# Patient Record
Sex: MALE | Race: WHITE | URBAN - METROPOLITAN AREA
[De-identification: names, ages, dates, MRNs, and addresses within clinical notes are randomized per-mention and may not be internally consistent; named-entity substitution may affect disease eponyms.]

---

## 2019-06-11 ENCOUNTER — EMERGENCY (EMERGENCY)
Facility: HOSPITAL | Age: 59
LOS: 1 days | Discharge: HOME | End: 2019-06-11
Attending: EMERGENCY MEDICINE | Admitting: EMERGENCY MEDICINE
Payer: COMMERCIAL

## 2019-06-11 VITALS
SYSTOLIC BLOOD PRESSURE: 100 MMHG | HEART RATE: 73 BPM | OXYGEN SATURATION: 96 % | TEMPERATURE: 98 F | RESPIRATION RATE: 16 BRPM | WEIGHT: 164.91 LBS | DIASTOLIC BLOOD PRESSURE: 64 MMHG

## 2019-06-11 DIAGNOSIS — R10.31 RIGHT LOWER QUADRANT PAIN: ICD-10-CM

## 2019-06-11 DIAGNOSIS — I10 ESSENTIAL (PRIMARY) HYPERTENSION: ICD-10-CM

## 2019-06-11 DIAGNOSIS — Z88.0 ALLERGY STATUS TO PENICILLIN: ICD-10-CM

## 2019-06-11 DIAGNOSIS — M54.9 DORSALGIA, UNSPECIFIED: ICD-10-CM

## 2019-06-11 DIAGNOSIS — M54.5 LOW BACK PAIN: ICD-10-CM

## 2019-06-11 DIAGNOSIS — Z79.899 OTHER LONG TERM (CURRENT) DRUG THERAPY: ICD-10-CM

## 2019-06-11 LAB
ALBUMIN SERPL ELPH-MCNC: 4 G/DL — SIGNIFICANT CHANGE UP (ref 3.4–5)
ALP SERPL-CCNC: 56 U/L — SIGNIFICANT CHANGE UP (ref 40–120)
ALT FLD-CCNC: 21 U/L — SIGNIFICANT CHANGE UP (ref 12–42)
ANION GAP SERPL CALC-SCNC: 8 MMOL/L — LOW (ref 9–16)
APPEARANCE UR: CLEAR — SIGNIFICANT CHANGE UP
AST SERPL-CCNC: 18 U/L — SIGNIFICANT CHANGE UP (ref 15–37)
BASOPHILS NFR BLD AUTO: 0.3 % — SIGNIFICANT CHANGE UP (ref 0–2)
BILIRUB SERPL-MCNC: 1.1 MG/DL — SIGNIFICANT CHANGE UP (ref 0.2–1.2)
BILIRUB UR-MCNC: NEGATIVE — SIGNIFICANT CHANGE UP
BUN SERPL-MCNC: 24 MG/DL — HIGH (ref 7–23)
CALCIUM SERPL-MCNC: 9.7 MG/DL — SIGNIFICANT CHANGE UP (ref 8.5–10.5)
CHLORIDE SERPL-SCNC: 102 MMOL/L — SIGNIFICANT CHANGE UP (ref 96–108)
CO2 SERPL-SCNC: 28 MMOL/L — SIGNIFICANT CHANGE UP (ref 22–31)
COLOR SPEC: YELLOW — SIGNIFICANT CHANGE UP
CREAT SERPL-MCNC: 1.01 MG/DL — SIGNIFICANT CHANGE UP (ref 0.5–1.3)
DIFF PNL FLD: NEGATIVE — SIGNIFICANT CHANGE UP
EOSINOPHIL NFR BLD AUTO: 1.7 % — SIGNIFICANT CHANGE UP (ref 0–6)
GLUCOSE SERPL-MCNC: 122 MG/DL — HIGH (ref 70–99)
GLUCOSE UR QL: NEGATIVE — SIGNIFICANT CHANGE UP
HCT VFR BLD CALC: 44.7 % — SIGNIFICANT CHANGE UP (ref 39–50)
HGB BLD-MCNC: 15.7 G/DL — SIGNIFICANT CHANGE UP (ref 13–17)
IMM GRANULOCYTES NFR BLD AUTO: 0.4 % — SIGNIFICANT CHANGE UP (ref 0–1.5)
KETONES UR-MCNC: 15 MG/DL
LEUKOCYTE ESTERASE UR-ACNC: NEGATIVE — SIGNIFICANT CHANGE UP
LIDOCAIN IGE QN: 87 U/L — SIGNIFICANT CHANGE UP (ref 73–393)
LYMPHOCYTES # BLD AUTO: 10.8 % — LOW (ref 13–44)
MCHC RBC-ENTMCNC: 32.2 PG — SIGNIFICANT CHANGE UP (ref 27–34)
MCHC RBC-ENTMCNC: 35.1 G/DL — SIGNIFICANT CHANGE UP (ref 32–36)
MCV RBC AUTO: 91.8 FL — SIGNIFICANT CHANGE UP (ref 80–100)
MONOCYTES NFR BLD AUTO: 7.5 % — SIGNIFICANT CHANGE UP (ref 2–14)
NEUTROPHILS NFR BLD AUTO: 79.3 % — HIGH (ref 43–77)
NITRITE UR-MCNC: NEGATIVE — SIGNIFICANT CHANGE UP
PH UR: 6 — SIGNIFICANT CHANGE UP (ref 5–8)
PLATELET # BLD AUTO: 259 K/UL — SIGNIFICANT CHANGE UP (ref 150–400)
POTASSIUM SERPL-MCNC: 4.3 MMOL/L — SIGNIFICANT CHANGE UP (ref 3.5–5.3)
POTASSIUM SERPL-SCNC: 4.3 MMOL/L — SIGNIFICANT CHANGE UP (ref 3.5–5.3)
PROT SERPL-MCNC: 7.5 G/DL — SIGNIFICANT CHANGE UP (ref 6.4–8.2)
PROT UR-MCNC: NEGATIVE MG/DL — SIGNIFICANT CHANGE UP
RBC # BLD: 4.87 M/UL — SIGNIFICANT CHANGE UP (ref 4.2–5.8)
RBC # FLD: 13.1 % — SIGNIFICANT CHANGE UP (ref 10.3–14.5)
SODIUM SERPL-SCNC: 138 MMOL/L — SIGNIFICANT CHANGE UP (ref 132–145)
SP GR SPEC: 1.02 — SIGNIFICANT CHANGE UP (ref 1–1.03)
UROBILINOGEN FLD QL: 0.2 E.U./DL — SIGNIFICANT CHANGE UP
WBC # BLD: 11.9 K/UL — HIGH (ref 3.8–10.5)
WBC # FLD AUTO: 11.9 K/UL — HIGH (ref 3.8–10.5)

## 2019-06-11 PROCEDURE — 99218: CPT

## 2019-06-11 PROCEDURE — 71275 CT ANGIOGRAPHY CHEST: CPT | Mod: 26

## 2019-06-11 PROCEDURE — 74176 CT ABD & PELVIS W/O CONTRAST: CPT | Mod: 26

## 2019-06-11 PROCEDURE — 74174 CTA ABD&PLVS W/CONTRAST: CPT | Mod: 26

## 2019-06-11 RX ORDER — MORPHINE SULFATE 50 MG/1
2 CAPSULE, EXTENDED RELEASE ORAL ONCE
Refills: 0 | Status: DISCONTINUED | OUTPATIENT
Start: 2019-06-11 | End: 2019-06-11

## 2019-06-11 RX ORDER — SODIUM CHLORIDE 9 MG/ML
1000 INJECTION INTRAMUSCULAR; INTRAVENOUS; SUBCUTANEOUS ONCE
Refills: 0 | Status: COMPLETED | OUTPATIENT
Start: 2019-06-11 | End: 2019-06-11

## 2019-06-11 RX ORDER — DOCUSATE SODIUM 100 MG
100 CAPSULE ORAL ONCE
Refills: 0 | Status: COMPLETED | OUTPATIENT
Start: 2019-06-11 | End: 2019-06-11

## 2019-06-11 RX ORDER — ACETAMINOPHEN 500 MG
650 TABLET ORAL ONCE
Refills: 0 | Status: COMPLETED | OUTPATIENT
Start: 2019-06-11 | End: 2019-06-11

## 2019-06-11 RX ORDER — SODIUM CHLORIDE 9 MG/ML
1000 INJECTION, SOLUTION INTRAVENOUS
Refills: 0 | Status: DISCONTINUED | OUTPATIENT
Start: 2019-06-11 | End: 2019-06-15

## 2019-06-11 RX ORDER — MORPHINE SULFATE 50 MG/1
4 CAPSULE, EXTENDED RELEASE ORAL ONCE
Refills: 0 | Status: DISCONTINUED | OUTPATIENT
Start: 2019-06-11 | End: 2019-06-11

## 2019-06-11 RX ADMIN — SODIUM CHLORIDE 1000 MILLILITER(S): 9 INJECTION INTRAMUSCULAR; INTRAVENOUS; SUBCUTANEOUS at 18:55

## 2019-06-11 RX ADMIN — MORPHINE SULFATE 4 MILLIGRAM(S): 50 CAPSULE, EXTENDED RELEASE ORAL at 21:40

## 2019-06-11 RX ADMIN — MORPHINE SULFATE 4 MILLIGRAM(S): 50 CAPSULE, EXTENDED RELEASE ORAL at 21:27

## 2019-06-11 NOTE — ED ADULT NURSE NOTE - CHIEF COMPLAINT QUOTE
pt BIBA for right back radiating to RLQ and right testicle after reaching for a portrait while at work.

## 2019-06-11 NOTE — ED CDU PROVIDER INITIAL DAY NOTE - DETAILS
3 days of R flank --> abdomen pain, worsened today.  No tenderness on exam, comfortable appearing.  .  CTA without evidence of dissection.  Pain persisted and when When patients stands there is a bulge in the R hemiabdomen.  Reviewed the CT again with the attending Dr. Maier and no evidence of abdominal wall hernia, hematoma, edema.  Stool filled cecum in that area.  Last "real" BM was a few days ago.  Will place patient on observation for pain control.

## 2019-06-11 NOTE — ED PROVIDER NOTE - CLINICAL SUMMARY MEDICAL DECISION MAKING FREE TEXT BOX
57 y/o male presents with intermittent right back pain which worsened today after lifting very light object. Patient reports pain starts in the flank and radiates around into the groin, aching in nature. Denies  or GI symptoms. Patient is refusing pain meds on arrival, with hx of GI bleed from NSAIDs in the remote past, denies FHx of kidney stones, no abdominal surgeries. Labs, fluids, and imaging ordered.

## 2019-06-11 NOTE — ED ADULT NURSE REASSESSMENT NOTE - NS ED NURSE REASSESS COMMENT FT1
Patient refused morphine for pain control.  made aware.  to speak with patient at bedside. Pt resting in stretcher at this time. Appears in no acute distress. Safety and comfort measures maintained, will continue to monitor.

## 2019-06-11 NOTE — ED PROVIDER NOTE - OBJECTIVE STATEMENT
59 y/o male with PMHx of HTN (on Lisinopril), BPH (difficulty urinating at baseline), and ulcers (surgery in 2010) presents to ED c/o abd pain. 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 States he was reaching for a painting today when he experienced sudden onset of sharp right lower back pain radiating to the right abd. Patient currently reports waxing and waning right-sided abd pain radiating to the right groin, worse with standing, currently qualifies pain as 5/10, pain is 7/10 when standing. Denies any fever, chills, nausea, vomiting, diarrhea, hematuria, or swelling to the genitals. 57 y/o male with PMHx of HTN (on Lisinopril), BPH (difficulty urinating at baseline), and ulcers (surgery in 2010) presents to ED c/o flank pain. 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 States he was reaching for a painting today when he experienced sudden onset of sharp right lower back pain radiating to the right abd. Patient currently reports waxing and waning right flank pain radiating to the right groin, worse with standing, currently qualifies pain as 5/10, pain is 7/10 when standing. Denies any fever, chills, nausea, vomiting, diarrhea, hematuria, or swelling to the genitals.

## 2019-06-11 NOTE — ED ADULT NURSE NOTE - NSIMPLEMENTINTERV_GEN_ALL_ED
Implemented All Fall with Harm Risk Interventions:  Martin to call system. Call bell, personal items and telephone within reach. Instruct patient to call for assistance. Room bathroom lighting operational. Non-slip footwear when patient is off stretcher. Physically safe environment: no spills, clutter or unnecessary equipment. Stretcher in lowest position, wheels locked, appropriate side rails in place. Provide visual cue, wrist band, yellow gown, etc. Monitor gait and stability. Monitor for mental status changes and reorient to person, place, and time. Review medications for side effects contributing to fall risk. Reinforce activity limits and safety measures with patient and family. Provide visual clues: red socks.

## 2019-06-11 NOTE — ED PROVIDER NOTE - PROGRESS NOTE DETAILS
CT renal stone hunt without evidence of kidney stone.  Now described his pain as something "tearing" in his abdomen.  Femoral pulses bounding and symmetric.  CTA c/a/p ordered to evaluate for dissection.  At this point the patient is now amenable to pain medication. CTA without evidence of dissection.  Pain persists.  When patients stands there is a bulge in the R jennifer-abdomen.  Reviewed the CT again with the attending Dr. Maier and no evidence of abdominal wall hernia, hematoma, edema.  Stool filled cecum in that area.  Last "real" BM was a few days ago.  Will place patient on observation for pain control.

## 2019-06-11 NOTE — ED PROVIDER NOTE - CONSTITUTIONAL, MLM
normal... Well appearing, well nourished, awake, alert, oriented to person, place, time/situation and in no apparent distress. Laying on his side, well nourished, awake, alert, oriented to person, place, time/situation and in no apparent distress.

## 2019-06-11 NOTE — ED CDU PROVIDER INITIAL DAY NOTE - OBJECTIVE STATEMENT
57 y/o male with PMHx of HTN (on Lisinopril), BPH (difficulty urinating at baseline), and ulcers (surgery in 2010) presents to ED c/o flank pain. 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 States he was reaching for a painting today when he experienced sudden onset of sharp right lower back pain radiating to the right abd. Patient currently reports waxing and waning right flank pain radiating to the right groin, worse with standing, currently qualifies pain as 5/10, pain is 7/10 when standing. Denies any fever, chills, nausea, vomiting, diarrhea, hematuria, or swelling to the genitals.

## 2019-06-12 VITALS
OXYGEN SATURATION: 100 % | SYSTOLIC BLOOD PRESSURE: 122 MMHG | HEART RATE: 64 BPM | RESPIRATION RATE: 17 BRPM | DIASTOLIC BLOOD PRESSURE: 84 MMHG

## 2019-06-12 PROCEDURE — 99217: CPT

## 2019-06-12 RX ORDER — POLYETHYLENE GLYCOL 3350 17 G/17G
17 POWDER, FOR SOLUTION ORAL
Qty: 100 | Refills: 0
Start: 2019-06-12 | End: 2019-08-01

## 2019-06-12 RX ADMIN — SODIUM CHLORIDE 150 MILLILITER(S): 9 INJECTION, SOLUTION INTRAVENOUS at 00:30

## 2019-06-12 RX ADMIN — Medication 100 MILLIGRAM(S): at 00:30

## 2019-06-12 NOTE — ED CDU PROVIDER DISPOSITION NOTE - NSFOLLOWUPINSTRUCTIONS_ED_ALL_ED_FT
Follow up with your primary care doctor  Take tylenol 650mg every 6 hours for pain as needed  Use warm compress 5-10 minutes every hour for comfort as needed  Take percocet for severe pain only as needed  Percocet can cause drowsiness  Return immediately for any new or worsening symptoms or any new concerns

## 2019-06-12 NOTE — ED CDU PROVIDER SUBSEQUENT DAY NOTE - MEDICAL DECISION MAKING DETAILS
pt endorsed to me to be placed on observation overnight.  CT and labs reviewed w/ previous ED provider.  prn analgesia.  reassess

## 2019-06-12 NOTE — ED ADULT NURSE REASSESSMENT NOTE - NS ED NURSE REASSESS COMMENT FT1
Pt sleeping in stretcher at this time with no s.s of acute distress at this time. pt stable will continue to  monitor.

## 2021-07-13 NOTE — ED ADULT TRIAGE NOTE - CHIEF COMPLAINT QUOTE
pt BIBA for right back radiating to RLQ and right testicle after reaching for a portrait while at work. 13-Jul-2021

## 2023-03-22 NOTE — ED ADULT TRIAGE NOTE - TEMPERATURE IN CELSIUS (DEGREES C)
Chief Complaint   Patient presents with   • Back Pain     Left side middle pain in back that radiates to shoulder. Patient reports pain started last night. No injury noted.        SUBJECTIVE:  Carlos is a 73 year old male who is seen for sudden onset upper back pain radiating into chest and left shoulder worse with activity worse with bending and movement.  He feels chest pain presently.    Past Medical History:   Diagnosis Date   • Arthritis    • Essential (primary) hypertension    • GERD (gastroesophageal reflux disease)    • Osteoarthrosis, unspecified whether generalized or localized, unspecified site    • Pneumonia 60's, 2005   • Sleep apnea    • Unspecified otitis media    • Unspecified sinusitis (chronic)        Family History   Problem Relation Age of Onset   • Heart disease Mother    • High blood pressure Mother    • Osteoarthritis Mother    • Cancer Mother         breast cancer   • Heart disease Father         pacemaker   • Stroke Father    • Dementia/Alzheimers Father    • Diabetes Brother    • High blood pressure Brother    • Osteoarthritis Brother         joint surgeries       Social History     Tobacco Use   Smoking Status Never   Smokeless Tobacco Never       ALLERGIES:  No Known Allergies    Current Outpatient Medications   Medication Sig Dispense Refill   • NON FORMULARY Brain health supplement     • hydrochlorothiazide (HYDRODIURIL) 25 MG tablet Take 1 tablet by mouth daily. 90 tablet 3   • metoPROLOL succinate (TOPROL-XL) 25 MG 24 hr tablet Take 1 tablet by mouth daily. 90 tablet 3   • amLODIPine (NORVASC) 5 MG tablet Take 1 tablet by mouth daily. 90 tablet 3   • fenofibrate (Tricor) 48 MG tablet Take 2 tablets by mouth daily. 180 tablet 3   • sildenafil (Viagra) 100 MG tablet Take 0.5-1 tablets by mouth as needed for Erectile Dysfunction (1/2 - 1 hour before sex). 9 tablet 6   • acetaminophen (TYLENOL) 500 MG tablet Take 1,000 mg by mouth every 6 hours as needed for Pain.     • Menthol, Topical  Analgesic, (BIOFREEZE EX) Apply small amount topically daily as needed for pain.     • Multiple Vitamins-Minerals (MULTIVITAMIN PO) Take 1 tablet by mouth daily.     • Ascorbic Acid (VITAMIN C PO) Take 1 tablet by mouth daily.     • Glucosamine HCl (GLUCOSAMINE PO) Take 1 tablet by mouth daily.     • aspirin 325 MG tablet Take 325 mg by mouth daily.     • lactobacillus acidophilus (BACID) Take 1 tablet by mouth daily.       No current facility-administered medications for this visit.       REVIEW OF SYSTEMS:  In addition to that noted above, review of systems is remarkable for:  Left-sided back shoulder and chest pain sudden onset since last night.  He does have shortness of breath with activity  Rest of the 14-point review of systems reviewed with patient and are negative.    OBJECTIVE:   Visit Vitals  /54   Pulse 78   Temp 98.8 °F (37.1 °C) (Tympanic)   Resp 18   SpO2 99%     VITALS:     GENERAL: Appears stated age.  PSYCHIATRIC:  Affect is good, mood good, alert and oriented x3, cranial nerves 2-12 grossly intact.  Rest of exam deferred.      LABS:   Admission on 12/16/2022, Discharged on 12/17/2022   Component Date Value Ref Range Status   • Rapid SARS-COV-2 by PCR 12/17/2022 Not Detected  Not Detected / Detected / Presumptive Positive / Inhibitors present Final   • Influenza A by PCR 12/17/2022 Detected (A)  Not Detected Final   • Influenza B by PCR 12/17/2022 Not Detected  Not Detected Final   • RSV BY PCR 12/17/2022 Not Detected  Not Detected Final   • Isolation Guidelines 12/17/2022    Final   • Procedural Comment 12/17/2022    Final        ASSESSMENT/ PLAN:  1. Back pain with chest pain patient is presenting with some symptoms that are concerning for possible cardiothoracic issue.  For this reason I recommended to patient that he be seen to the ER for higher level care.  Patient agrees to this.  He was deemed stable for transport   36.8